# Patient Record
Sex: MALE | Race: OTHER
[De-identification: names, ages, dates, MRNs, and addresses within clinical notes are randomized per-mention and may not be internally consistent; named-entity substitution may affect disease eponyms.]

---

## 2020-10-07 ENCOUNTER — HOSPITAL ENCOUNTER (OUTPATIENT)
Dept: HOSPITAL 76 - LAB.WCP | Age: 85
Discharge: HOME | End: 2020-10-07
Attending: FAMILY MEDICINE
Payer: MEDICARE

## 2020-10-07 DIAGNOSIS — I10: ICD-10-CM

## 2020-10-07 DIAGNOSIS — E78.5: Primary | ICD-10-CM

## 2020-10-07 DIAGNOSIS — Z12.5: ICD-10-CM

## 2020-10-07 LAB
ALBUMIN DIAFP-MCNC: 4 G/DL (ref 3.2–5.5)
ALBUMIN/GLOB SERPL: 1.4 {RATIO} (ref 1–2.2)
ALP SERPL-CCNC: 73 IU/L (ref 42–121)
ALT SERPL W P-5'-P-CCNC: 22 IU/L (ref 10–60)
ANION GAP SERPL CALCULATED.4IONS-SCNC: 7 MMOL/L (ref 6–13)
AST SERPL W P-5'-P-CCNC: 18 IU/L (ref 10–42)
BASOPHILS NFR BLD AUTO: 0 10^3/UL (ref 0–0.1)
BASOPHILS NFR BLD AUTO: 0.9 %
BILIRUB BLD-MCNC: 0.6 MG/DL (ref 0.2–1)
BUN SERPL-MCNC: 14 MG/DL (ref 6–20)
CALCIUM UR-MCNC: 9 MG/DL (ref 8.5–10.3)
CHLORIDE SERPL-SCNC: 102 MMOL/L (ref 101–111)
CHOLEST SERPL-MCNC: 188 MG/DL
CO2 SERPL-SCNC: 27 MMOL/L (ref 21–32)
CREAT SERPLBLD-SCNC: 0.6 MG/DL (ref 0.6–1.2)
EOSINOPHIL # BLD AUTO: 0.3 10^3/UL (ref 0–0.7)
EOSINOPHIL NFR BLD AUTO: 6.1 %
ERYTHROCYTE [DISTWIDTH] IN BLOOD BY AUTOMATED COUNT: 12.5 % (ref 12–15)
GLOBULIN SER-MCNC: 2.9 G/DL (ref 2.1–4.2)
GLUCOSE SERPL-MCNC: 95 MG/DL (ref 70–100)
HDLC SERPL-MCNC: 54 MG/DL
HDLC SERPL: 3.5 {RATIO} (ref ?–5)
HGB UR QL STRIP: 12.9 G/DL (ref 14–18)
LDLC SERPL CALC-MCNC: 112 MG/DL
LDLC/HDLC SERPL: 2.1 {RATIO} (ref ?–3.6)
LYMPHOCYTES # SPEC AUTO: 1.3 10^3/UL (ref 1.5–3.5)
LYMPHOCYTES NFR BLD AUTO: 29.3 %
MCH RBC QN AUTO: 32.7 PG (ref 27–31)
MCHC RBC AUTO-ENTMCNC: 32.8 G/DL (ref 32–36)
MCV RBC AUTO: 99.7 FL (ref 80–94)
MONOCYTES # BLD AUTO: 0.4 10^3/UL (ref 0–1)
MONOCYTES NFR BLD AUTO: 9.6 %
NEUTROPHILS # BLD AUTO: 2.5 10^3/UL (ref 1.5–6.6)
NEUTROPHILS # SNV AUTO: 4.6 X10^3/UL (ref 4.8–10.8)
NEUTROPHILS NFR BLD AUTO: 53.9 %
PDW BLD AUTO: 10.2 FL (ref 7.4–11.4)
PLATELET # BLD: 235 10^3/UL (ref 130–450)
PROT SPEC-MCNC: 6.9 G/DL (ref 6.7–8.2)
RBC MAR: 3.94 10^6/UL (ref 4.7–6.1)
SODIUM SERPLBLD-SCNC: 136 MMOL/L (ref 135–145)
VLDLC SERPL-SCNC: 22 MG/DL

## 2020-10-07 PROCEDURE — 83721 ASSAY OF BLOOD LIPOPROTEIN: CPT

## 2020-10-07 PROCEDURE — 36415 COLL VENOUS BLD VENIPUNCTURE: CPT

## 2020-10-07 PROCEDURE — 85025 COMPLETE CBC W/AUTO DIFF WBC: CPT

## 2020-10-07 PROCEDURE — 84443 ASSAY THYROID STIM HORMONE: CPT

## 2020-10-07 PROCEDURE — 80053 COMPREHEN METABOLIC PANEL: CPT

## 2020-10-07 PROCEDURE — 80061 LIPID PANEL: CPT

## 2020-10-07 PROCEDURE — 84153 ASSAY OF PSA TOTAL: CPT

## 2021-07-12 ENCOUNTER — HOSPITAL ENCOUNTER (OUTPATIENT)
Dept: HOSPITAL 76 - LAB.WCP | Age: 86
Discharge: HOME | End: 2021-07-12
Attending: FAMILY MEDICINE
Payer: MEDICARE

## 2021-07-12 DIAGNOSIS — E78.5: ICD-10-CM

## 2021-07-12 DIAGNOSIS — I10: Primary | ICD-10-CM

## 2021-07-12 LAB
ALBUMIN DIAFP-MCNC: 4.2 G/DL (ref 3.2–5.5)
ALBUMIN/GLOB SERPL: 1.3 {RATIO} (ref 1–2.2)
ALP SERPL-CCNC: 81 IU/L (ref 42–121)
ALT SERPL W P-5'-P-CCNC: 24 IU/L (ref 10–60)
ANION GAP SERPL CALCULATED.4IONS-SCNC: 8 MMOL/L (ref 6–13)
AST SERPL W P-5'-P-CCNC: 20 IU/L (ref 10–42)
BASOPHILS NFR BLD AUTO: 0.1 10^3/UL (ref 0–0.1)
BASOPHILS NFR BLD AUTO: 0.9 %
BILIRUB BLD-MCNC: 0.8 MG/DL (ref 0.2–1)
BUN SERPL-MCNC: 21 MG/DL (ref 6–20)
CALCIUM UR-MCNC: 9 MG/DL (ref 8.5–10.3)
CHLORIDE SERPL-SCNC: 95 MMOL/L (ref 101–111)
CHOLEST SERPL-MCNC: 190 MG/DL
CO2 SERPL-SCNC: 27 MMOL/L (ref 21–32)
CREAT SERPLBLD-SCNC: 0.7 MG/DL (ref 0.6–1.2)
EOSINOPHIL # BLD AUTO: 0.3 10^3/UL (ref 0–0.7)
EOSINOPHIL NFR BLD AUTO: 5.3 %
ERYTHROCYTE [DISTWIDTH] IN BLOOD BY AUTOMATED COUNT: 12.1 % (ref 12–15)
GFRSERPLBLD MDRD-ARVRAT: 107 ML/MIN/{1.73_M2} (ref 89–?)
GLOBULIN SER-MCNC: 3.2 G/DL (ref 2.1–4.2)
GLUCOSE SERPL-MCNC: 132 MG/DL (ref 70–100)
HCT VFR BLD AUTO: 38.4 % (ref 42–52)
HDLC SERPL-MCNC: 54 MG/DL
HDLC SERPL: 3.5 {RATIO} (ref ?–5)
HGB UR QL STRIP: 13.1 G/DL (ref 14–18)
LDLC SERPL CALC-MCNC: 99 MG/DL
LDLC/HDLC SERPL: 1.8 {RATIO} (ref ?–3.6)
LYMPHOCYTES # SPEC AUTO: 1.6 10^3/UL (ref 1.5–3.5)
LYMPHOCYTES NFR BLD AUTO: 29.2 %
MCH RBC QN AUTO: 33.1 PG (ref 27–31)
MCHC RBC AUTO-ENTMCNC: 34.1 G/DL (ref 32–36)
MCV RBC AUTO: 97 FL (ref 80–94)
MONOCYTES # BLD AUTO: 0.6 10^3/UL (ref 0–1)
MONOCYTES NFR BLD AUTO: 10.3 %
NEUTROPHILS # BLD AUTO: 3 10^3/UL (ref 1.5–6.6)
NEUTROPHILS # SNV AUTO: 5.5 X10^3/UL (ref 4.8–10.8)
NEUTROPHILS NFR BLD AUTO: 54.1 %
NRBC # BLD AUTO: 0 /100WBC
NRBC # BLD AUTO: 0 X10^3/UL
PDW BLD AUTO: 10.3 FL (ref 7.4–11.4)
PLATELET # BLD: 225 10^3/UL (ref 130–450)
POTASSIUM SERPL-SCNC: 3.9 MMOL/L (ref 3.5–5)
PROT SPEC-MCNC: 7.4 G/DL (ref 6.7–8.2)
RBC MAR: 3.96 10^6/UL (ref 4.7–6.1)
SODIUM SERPLBLD-SCNC: 130 MMOL/L (ref 135–145)
TRIGL P FAST SERPL-MCNC: 184 MG/DL
VLDLC SERPL-SCNC: 37 MG/DL

## 2021-07-12 PROCEDURE — 80053 COMPREHEN METABOLIC PANEL: CPT

## 2021-07-12 PROCEDURE — 83721 ASSAY OF BLOOD LIPOPROTEIN: CPT

## 2021-07-12 PROCEDURE — 85025 COMPLETE CBC W/AUTO DIFF WBC: CPT

## 2021-07-12 PROCEDURE — 80061 LIPID PANEL: CPT

## 2021-07-12 PROCEDURE — 36415 COLL VENOUS BLD VENIPUNCTURE: CPT

## 2022-02-17 ENCOUNTER — HOSPITAL ENCOUNTER (OUTPATIENT)
Dept: HOSPITAL 76 - LAB.N | Age: 87
Discharge: HOME | End: 2022-02-17
Attending: FAMILY MEDICINE
Payer: MEDICARE

## 2022-02-17 DIAGNOSIS — I10: Primary | ICD-10-CM

## 2022-02-17 DIAGNOSIS — E78.5: ICD-10-CM

## 2022-02-17 DIAGNOSIS — E88.81: ICD-10-CM

## 2022-02-17 LAB
ALBUMIN DIAFP-MCNC: 4 G/DL (ref 3.2–5.5)
ALBUMIN/GLOB SERPL: 1.3 {RATIO} (ref 1–2.2)
ALP SERPL-CCNC: 86 IU/L (ref 42–121)
ALT SERPL W P-5'-P-CCNC: 23 IU/L (ref 10–60)
ANION GAP SERPL CALCULATED.4IONS-SCNC: 11 MMOL/L (ref 6–13)
AST SERPL W P-5'-P-CCNC: 18 IU/L (ref 10–42)
BASOPHILS NFR BLD AUTO: 0 10^3/UL (ref 0–0.1)
BASOPHILS NFR BLD AUTO: 0.7 %
BILIRUB BLD-MCNC: 0.8 MG/DL (ref 0.2–1)
BUN SERPL-MCNC: 20 MG/DL (ref 6–20)
CALCIUM UR-MCNC: 9.4 MG/DL (ref 8.5–10.3)
CHLORIDE SERPL-SCNC: 97 MMOL/L (ref 101–111)
CHOLEST SERPL-MCNC: 174 MG/DL
CO2 SERPL-SCNC: 27 MMOL/L (ref 21–32)
CREAT SERPLBLD-SCNC: 0.7 MG/DL (ref 0.6–1.2)
EOSINOPHIL # BLD AUTO: 0.2 10^3/UL (ref 0–0.7)
EOSINOPHIL NFR BLD AUTO: 5.8 %
ERYTHROCYTE [DISTWIDTH] IN BLOOD BY AUTOMATED COUNT: 12.1 % (ref 12–15)
EST. AVERAGE GLUCOSE BLD GHB EST-MCNC: 126 MG/DL (ref 70–100)
GFRSERPLBLD MDRD-ARVRAT: 107 ML/MIN/{1.73_M2} (ref 89–?)
GLOBULIN SER-MCNC: 3.2 G/DL (ref 2.1–4.2)
GLUCOSE SERPL-MCNC: 108 MG/DL (ref 70–100)
HBA1C MFR BLD HPLC: 6 % (ref 4.27–6.07)
HCT VFR BLD AUTO: 38.5 % (ref 42–52)
HDLC SERPL-MCNC: 60 MG/DL
HDLC SERPL: 2.9 {RATIO} (ref ?–5)
HGB UR QL STRIP: 13.3 G/DL (ref 14–18)
LDLC SERPL CALC-MCNC: 94 MG/DL
LDLC/HDLC SERPL: 1.6 {RATIO} (ref ?–3.6)
LYMPHOCYTES # SPEC AUTO: 1.4 10^3/UL (ref 1.5–3.5)
LYMPHOCYTES NFR BLD AUTO: 32.6 %
MCH RBC QN AUTO: 33.7 PG (ref 27–31)
MCHC RBC AUTO-ENTMCNC: 34.5 G/DL (ref 32–36)
MCV RBC AUTO: 97.5 FL (ref 80–94)
MONOCYTES # BLD AUTO: 0.5 10^3/UL (ref 0–1)
MONOCYTES NFR BLD AUTO: 12.3 %
NEUTROPHILS # BLD AUTO: 2 10^3/UL (ref 1.5–6.6)
NEUTROPHILS # SNV AUTO: 4.1 X10^3/UL (ref 4.8–10.8)
NEUTROPHILS NFR BLD AUTO: 48.6 %
NRBC # BLD AUTO: 0 /100WBC
NRBC # BLD AUTO: 0 X10^3/UL
PDW BLD AUTO: 10 FL (ref 7.4–11.4)
PLATELET # BLD: 244 10^3/UL (ref 130–450)
POTASSIUM SERPL-SCNC: 4 MMOL/L (ref 3.5–5)
PROT SPEC-MCNC: 7.2 G/DL (ref 6.7–8.2)
RBC MAR: 3.95 10^6/UL (ref 4.7–6.1)
SODIUM SERPLBLD-SCNC: 135 MMOL/L (ref 135–145)
TRIGL P FAST SERPL-MCNC: 101 MG/DL
VLDLC SERPL-SCNC: 20 MG/DL

## 2022-02-17 PROCEDURE — 83721 ASSAY OF BLOOD LIPOPROTEIN: CPT

## 2022-02-17 PROCEDURE — 80053 COMPREHEN METABOLIC PANEL: CPT

## 2022-02-17 PROCEDURE — 83036 HEMOGLOBIN GLYCOSYLATED A1C: CPT

## 2022-02-17 PROCEDURE — 85025 COMPLETE CBC W/AUTO DIFF WBC: CPT

## 2022-02-17 PROCEDURE — 36415 COLL VENOUS BLD VENIPUNCTURE: CPT

## 2022-02-17 PROCEDURE — 80061 LIPID PANEL: CPT

## 2022-05-26 ENCOUNTER — HOSPITAL ENCOUNTER (OUTPATIENT)
Dept: HOSPITAL 76 - LAB.N | Age: 87
Discharge: HOME | End: 2022-05-26
Attending: FAMILY MEDICINE
Payer: MEDICARE

## 2022-05-26 DIAGNOSIS — R22.41: Primary | ICD-10-CM

## 2022-05-26 LAB
ALBUMIN DIAFP-MCNC: 3.9 G/DL (ref 3.2–5.5)
ALBUMIN/GLOB SERPL: 1.2 {RATIO} (ref 1–2.2)
ALP SERPL-CCNC: 83 IU/L (ref 42–121)
ALT SERPL W P-5'-P-CCNC: 19 IU/L (ref 10–60)
ANION GAP SERPL CALCULATED.4IONS-SCNC: 10 MMOL/L (ref 6–13)
AST SERPL W P-5'-P-CCNC: 18 IU/L (ref 10–42)
BASOPHILS NFR BLD AUTO: 0 10^3/UL (ref 0–0.1)
BASOPHILS NFR BLD AUTO: 0.3 %
BILIRUB BLD-MCNC: 0.7 MG/DL (ref 0.2–1)
BUN SERPL-MCNC: 21 MG/DL (ref 6–20)
CALCIUM UR-MCNC: 9.1 MG/DL (ref 8.5–10.3)
CHLORIDE SERPL-SCNC: 97 MMOL/L (ref 101–111)
CO2 SERPL-SCNC: 26 MMOL/L (ref 21–32)
CREAT SERPLBLD-SCNC: 0.8 MG/DL (ref 0.6–1.2)
EOSINOPHIL # BLD AUTO: 0.1 10^3/UL (ref 0–0.7)
EOSINOPHIL NFR BLD AUTO: 1.8 %
ERYTHROCYTE [DISTWIDTH] IN BLOOD BY AUTOMATED COUNT: 12.1 % (ref 12–15)
GFRSERPLBLD MDRD-ARVRAT: 91 ML/MIN/{1.73_M2} (ref 89–?)
GLOBULIN SER-MCNC: 3.3 G/DL (ref 2.1–4.2)
GLUCOSE SERPL-MCNC: 122 MG/DL (ref 70–100)
HCT VFR BLD AUTO: 35.6 % (ref 42–52)
HGB UR QL STRIP: 12.2 G/DL (ref 14–18)
LYMPHOCYTES # SPEC AUTO: 1.1 10^3/UL (ref 1.5–3.5)
LYMPHOCYTES NFR BLD AUTO: 15.9 %
MCH RBC QN AUTO: 33.2 PG (ref 27–31)
MCHC RBC AUTO-ENTMCNC: 34.3 G/DL (ref 32–36)
MCV RBC AUTO: 97 FL (ref 80–94)
MONOCYTES # BLD AUTO: 0.8 10^3/UL (ref 0–1)
MONOCYTES NFR BLD AUTO: 11.6 %
NEUTROPHILS # BLD AUTO: 4.9 10^3/UL (ref 1.5–6.6)
NEUTROPHILS # SNV AUTO: 7 X10^3/UL (ref 4.8–10.8)
NEUTROPHILS NFR BLD AUTO: 70.1 %
NRBC # BLD AUTO: 0 /100WBC
NRBC # BLD AUTO: 0 X10^3/UL
PDW BLD AUTO: 10.3 FL (ref 7.4–11.4)
PLATELET # BLD: 226 10^3/UL (ref 130–450)
POTASSIUM SERPL-SCNC: 4.1 MMOL/L (ref 3.5–5)
PROT SPEC-MCNC: 7.2 G/DL (ref 6.7–8.2)
RBC MAR: 3.67 10^6/UL (ref 4.7–6.1)
SODIUM SERPLBLD-SCNC: 133 MMOL/L (ref 135–145)
URATE SERPL-MCNC: 5.9 MG/DL (ref 2.6–7.2)

## 2022-05-26 PROCEDURE — 85025 COMPLETE CBC W/AUTO DIFF WBC: CPT

## 2022-05-26 PROCEDURE — 85651 RBC SED RATE NONAUTOMATED: CPT

## 2022-05-26 PROCEDURE — 80053 COMPREHEN METABOLIC PANEL: CPT

## 2022-05-26 PROCEDURE — 36415 COLL VENOUS BLD VENIPUNCTURE: CPT

## 2022-05-26 PROCEDURE — 84550 ASSAY OF BLOOD/URIC ACID: CPT

## 2022-06-20 ENCOUNTER — HOSPITAL ENCOUNTER (OUTPATIENT)
Dept: HOSPITAL 76 - DI | Age: 87
Discharge: HOME | End: 2022-06-20
Attending: PODIATRIST
Payer: MEDICARE

## 2022-06-20 DIAGNOSIS — M19.071: Primary | ICD-10-CM

## 2022-06-21 NOTE — XRAY REPORT
PROCEDURE: Toe(s) RT 

 

INDICATIONS:  PAINFUL RIGHT HALLUX

 

TECHNIQUE:  3 views of the right first toe(s) acquired.  

 

COMPARISON:  None

 

FINDINGS:  

 

Bones:  No fractures or dislocations.  No suspicious bony lesions.  Particular osteophyte formation a
t the first metatarsophalangeal joint.

 

Soft tissues:  No suspicious soft tissue densities.  

 

IMPRESSION:  

Osteoarthritis. No acute fracture. No osseous lesion. If symptoms and/or clinical suspicion for patho
logy continue, further assessment with repeat plain films, or advanced imaging (e.g., CT, MRI, or bon
e scan) is recommended for further assessment.

 

Reviewed by: Roma Wells MD on 6/21/2022 10:14 AM PDT

Approved by: Roma Wells MD on 6/21/2022 10:14 AM PDT

 

 

Station ID:  [id number]

## 2023-01-11 ENCOUNTER — HOSPITAL ENCOUNTER (OUTPATIENT)
Dept: HOSPITAL 76 - LAB.N | Age: 88
Discharge: HOME | End: 2023-01-11
Attending: NURSE PRACTITIONER
Payer: MEDICARE

## 2023-01-11 DIAGNOSIS — I10: Primary | ICD-10-CM

## 2023-01-11 DIAGNOSIS — E78.5: ICD-10-CM

## 2023-01-11 DIAGNOSIS — N40.1: ICD-10-CM

## 2023-01-11 DIAGNOSIS — R97.20: ICD-10-CM

## 2023-01-11 LAB
ALBUMIN DIAFP-MCNC: 4 G/DL (ref 3.2–5.5)
ALBUMIN/GLOB SERPL: 1.2 {RATIO} (ref 1–2.2)
ALP SERPL-CCNC: 84 IU/L (ref 42–121)
ALT SERPL W P-5'-P-CCNC: 26 IU/L (ref 10–60)
ANION GAP SERPL CALCULATED.4IONS-SCNC: 8 MMOL/L (ref 6–13)
AST SERPL W P-5'-P-CCNC: 23 IU/L (ref 10–42)
BASOPHILS NFR BLD AUTO: 0 10^3/UL (ref 0–0.1)
BASOPHILS NFR BLD AUTO: 0.9 %
BILIRUB BLD-MCNC: 0.9 MG/DL (ref 0.2–1)
BUN SERPL-MCNC: 19 MG/DL (ref 6–20)
CALCIUM UR-MCNC: 9.5 MG/DL (ref 8.5–10.3)
CHLORIDE SERPL-SCNC: 102 MMOL/L (ref 101–111)
CHOLEST SERPL-MCNC: 201 MG/DL
CO2 SERPL-SCNC: 28 MMOL/L (ref 21–32)
CREAT SERPLBLD-SCNC: 0.7 MG/DL (ref 0.6–1.2)
EOSINOPHIL # BLD AUTO: 0.2 10^3/UL (ref 0–0.7)
EOSINOPHIL NFR BLD AUTO: 4.4 %
ERYTHROCYTE [DISTWIDTH] IN BLOOD BY AUTOMATED COUNT: 12.6 % (ref 12–15)
GFRSERPLBLD MDRD-ARVRAT: 107 ML/MIN/{1.73_M2} (ref 89–?)
GLOBULIN SER-MCNC: 3.3 G/DL (ref 2.1–4.2)
GLUCOSE SERPL-MCNC: 109 MG/DL (ref 70–100)
HCT VFR BLD AUTO: 38.9 % (ref 42–52)
HDLC SERPL-MCNC: 55 MG/DL
HDLC SERPL: 3.7 {RATIO} (ref ?–5)
HGB UR QL STRIP: 12.8 G/DL (ref 14–18)
LDLC SERPL CALC-MCNC: 122 MG/DL
LDLC/HDLC SERPL: 2.2 {RATIO} (ref ?–3.6)
LYMPHOCYTES # SPEC AUTO: 1.4 10^3/UL (ref 1.5–3.5)
LYMPHOCYTES NFR BLD AUTO: 31.4 %
MCH RBC QN AUTO: 32.4 PG (ref 27–31)
MCHC RBC AUTO-ENTMCNC: 32.9 G/DL (ref 32–36)
MCV RBC AUTO: 98.5 FL (ref 80–94)
MONOCYTES # BLD AUTO: 0.5 10^3/UL (ref 0–1)
MONOCYTES NFR BLD AUTO: 10.5 %
NEUTROPHILS # BLD AUTO: 2.4 10^3/UL (ref 1.5–6.6)
NEUTROPHILS # SNV AUTO: 4.6 X10^3/UL (ref 4.8–10.8)
NEUTROPHILS NFR BLD AUTO: 52.6 %
NRBC # BLD AUTO: 0 /100WBC
NRBC # BLD AUTO: 0 X10^3/UL
PDW BLD AUTO: 10.2 FL (ref 7.4–11.4)
PLATELET # BLD: 267 10^3/UL (ref 130–450)
POTASSIUM SERPL-SCNC: 3.9 MMOL/L (ref 3.5–5)
PROT SPEC-MCNC: 7.3 G/DL (ref 6.7–8.2)
RBC MAR: 3.95 10^6/UL (ref 4.7–6.1)
SODIUM SERPLBLD-SCNC: 138 MMOL/L (ref 135–145)
TRIGL P FAST SERPL-MCNC: 120 MG/DL
VLDLC SERPL-SCNC: 24 MG/DL

## 2023-01-11 PROCEDURE — 80061 LIPID PANEL: CPT

## 2023-01-11 PROCEDURE — 80053 COMPREHEN METABOLIC PANEL: CPT

## 2023-01-11 PROCEDURE — 83721 ASSAY OF BLOOD LIPOPROTEIN: CPT

## 2023-01-11 PROCEDURE — 85025 COMPLETE CBC W/AUTO DIFF WBC: CPT

## 2023-01-11 PROCEDURE — 36415 COLL VENOUS BLD VENIPUNCTURE: CPT

## 2023-01-11 PROCEDURE — 84153 ASSAY OF PSA TOTAL: CPT

## 2023-07-14 ENCOUNTER — HOSPITAL ENCOUNTER (OUTPATIENT)
Dept: HOSPITAL 76 - LAB.R | Age: 88
Discharge: HOME | End: 2023-07-14
Attending: PODIATRIST
Payer: MEDICARE

## 2023-07-14 DIAGNOSIS — L89.899: Primary | ICD-10-CM

## 2023-07-14 PROCEDURE — 87070 CULTURE OTHR SPECIMN AEROBIC: CPT

## 2023-07-14 PROCEDURE — 87205 SMEAR GRAM STAIN: CPT

## 2023-07-14 PROCEDURE — 87077 CULTURE AEROBIC IDENTIFY: CPT

## 2023-07-14 PROCEDURE — 87181 SC STD AGAR DILUTION PER AGT: CPT

## 2023-07-31 ENCOUNTER — HOSPITAL ENCOUNTER (OUTPATIENT)
Dept: HOSPITAL 76 - LAB.R | Age: 88
Discharge: HOME | End: 2023-07-31
Attending: PODIATRIST
Payer: MEDICARE

## 2023-07-31 DIAGNOSIS — L89.90: Primary | ICD-10-CM

## 2023-07-31 PROCEDURE — 87070 CULTURE OTHR SPECIMN AEROBIC: CPT

## 2023-07-31 PROCEDURE — 87181 SC STD AGAR DILUTION PER AGT: CPT

## 2023-07-31 PROCEDURE — 87205 SMEAR GRAM STAIN: CPT

## 2023-07-31 PROCEDURE — 87077 CULTURE AEROBIC IDENTIFY: CPT

## 2023-08-18 ENCOUNTER — HOSPITAL ENCOUNTER (OUTPATIENT)
Dept: HOSPITAL 76 - LAB | Age: 88
Discharge: HOME | End: 2023-08-18
Attending: PODIATRIST
Payer: MEDICARE

## 2023-08-18 DIAGNOSIS — L89.90: Primary | ICD-10-CM

## 2023-08-18 PROCEDURE — 87205 SMEAR GRAM STAIN: CPT

## 2023-08-18 PROCEDURE — 87070 CULTURE OTHR SPECIMN AEROBIC: CPT

## 2023-08-18 PROCEDURE — 87181 SC STD AGAR DILUTION PER AGT: CPT

## 2023-08-24 ENCOUNTER — HOSPITAL ENCOUNTER (OUTPATIENT)
Dept: HOSPITAL 76 - DI | Age: 88
Discharge: HOME | End: 2023-08-24
Attending: PODIATRIST
Payer: MEDICARE

## 2023-08-24 DIAGNOSIS — M79.9: Primary | ICD-10-CM

## 2023-08-24 NOTE — XRAY REPORT
PROCEDURE:  Foot 3 View RT

 

INDICATIONS:  RIGHT HALLUX WOUND

 

TECHNIQUE:  3 views of the foot were acquired.  

 

COMPARISON:  None.

 

FINDINGS:  

 

Bones:  No fractures or dislocations.  No suspicious bony lesions. Low bone mineralization. Plantar c
alcaneal enthesophyte. Pes planus. 

 

Soft tissues:  No suspicious soft tissue calcifications or masses. Soft tissue defect along the media
l great toe. 

 

 

IMPRESSION:  

Soft tissue defect along the medial great toe. No underlying bony abnormality to suggest acute ostium
 myelitis. However, the sensitivity of x-rays for osteomyelitis is variable. If there remains a high 
clinical concern, consider MRI.

 

 

 

Reviewed by: Sincere Hendrix on 8/24/2023 2:17 PM PDT

Approved by: Sincere Hendrix on 8/24/2023 2:17 PM PDT

 

 

Station ID:  [id number]

## 2023-09-04 ENCOUNTER — HOSPITAL ENCOUNTER (OUTPATIENT)
Dept: HOSPITAL 76 - LAB.R | Age: 88
Discharge: HOME | End: 2023-09-04
Attending: PODIATRIST
Payer: MEDICARE

## 2023-09-04 DIAGNOSIS — L89.90: Primary | ICD-10-CM

## 2023-09-04 PROCEDURE — 87070 CULTURE OTHR SPECIMN AEROBIC: CPT

## 2023-09-04 PROCEDURE — 87205 SMEAR GRAM STAIN: CPT

## 2024-09-12 ENCOUNTER — HOSPITAL ENCOUNTER (OUTPATIENT)
Dept: HOSPITAL 76 - LAB.N | Age: 89
Discharge: HOME | End: 2024-09-12
Attending: NURSE PRACTITIONER
Payer: MEDICARE

## 2024-09-12 DIAGNOSIS — E78.5: ICD-10-CM

## 2024-09-12 DIAGNOSIS — R73.03: ICD-10-CM

## 2024-09-12 DIAGNOSIS — I10: Primary | ICD-10-CM

## 2024-09-12 LAB
ALBUMIN DIAFP-MCNC: 3.9 G/DL (ref 3.2–5.5)
ALBUMIN/GLOB SERPL: 1.1 {RATIO} (ref 1–2.2)
ALP SERPL-CCNC: 91 IU/L (ref 42–121)
ALT SERPL W P-5'-P-CCNC: 16 IU/L (ref 10–60)
ANION GAP SERPL CALCULATED.4IONS-SCNC: 9 MMOL/L (ref 6–13)
AST SERPL W P-5'-P-CCNC: 16 IU/L (ref 10–42)
BASOPHILS NFR BLD AUTO: 0 10^3/UL (ref 0–0.1)
BASOPHILS NFR BLD AUTO: 0.4 %
BILIRUB BLD-MCNC: 0.4 MG/DL (ref 0.2–1)
BUN SERPL-MCNC: 23 MG/DL (ref 6–20)
CALCIUM UR-MCNC: 10.1 MG/DL (ref 8.5–10.3)
CHLORIDE SERPL-SCNC: 99 MMOL/L (ref 101–111)
CHOLEST SERPL-MCNC: 107 MG/DL
CO2 SERPL-SCNC: 26 MMOL/L (ref 21–32)
CREAT SERPLBLD-SCNC: 0.8 MG/DL (ref 0.6–1.3)
EOSINOPHIL # BLD AUTO: 0.2 10^3/UL (ref 0–0.7)
EOSINOPHIL NFR BLD AUTO: 3.4 %
ERYTHROCYTE [DISTWIDTH] IN BLOOD BY AUTOMATED COUNT: 12.6 % (ref 12–15)
EST. AVERAGE GLUCOSE BLD GHB EST-MCNC: 131 MG/DL (ref 70–100)
GFRSERPLBLD MDRD-ARVRAT: 91 ML/MIN/{1.73_M2} (ref 89–?)
GLOBULIN SER-MCNC: 3.5 G/DL (ref 2.1–4.2)
GLUCOSE SERPL-MCNC: 113 MG/DL (ref 74–104)
HBA1C MFR BLD HPLC: 6.2 % (ref 4.27–6.07)
HCT VFR BLD AUTO: 32.6 % (ref 42–52)
HDLC SERPL-MCNC: 57 MG/DL
HDLC SERPL: 1.9 {RATIO} (ref ?–5)
HGB UR QL STRIP: 10.9 G/DL (ref 14–18)
LDLC SERPL CALC-MCNC: 39 MG/DL
LDLC/HDLC SERPL: 0.7 {RATIO} (ref ?–3.6)
LYMPHOCYTES # SPEC AUTO: 1.2 10^3/UL (ref 1.5–3.5)
LYMPHOCYTES NFR BLD AUTO: 20.8 %
MCH RBC QN AUTO: 32.6 PG (ref 27–31)
MCHC RBC AUTO-ENTMCNC: 33.4 G/DL (ref 32–36)
MCV RBC AUTO: 97.6 FL (ref 80–94)
MONOCYTES # BLD AUTO: 0.7 10^3/UL (ref 0–1)
MONOCYTES NFR BLD AUTO: 12.4 %
NEUTROPHILS # BLD AUTO: 3.5 10^3/UL (ref 1.5–6.6)
NEUTROPHILS # SNV AUTO: 5.6 X10^3/UL (ref 4.8–10.8)
NEUTROPHILS NFR BLD AUTO: 62.8 %
NRBC # BLD AUTO: 0 /100WBC
NRBC # BLD AUTO: 0 X10^3/UL
PDW BLD AUTO: 10.1 FL (ref 7.4–11.4)
PLATELET # BLD: 275 10^3/UL (ref 130–450)
POTASSIUM SERPL-SCNC: 3.9 MMOL/L (ref 3.5–4.5)
PROT SPEC-MCNC: 7.4 G/DL (ref 6.4–8.9)
RBC MAR: 3.34 10^6/UL (ref 4.7–6.1)
SODIUM SERPLBLD-SCNC: 134 MMOL/L (ref 135–145)
TRIGL P FAST SERPL-MCNC: 57 MG/DL
TSH SERPL-ACNC: 2.64 UIU/ML (ref 0.34–5.6)
VLDLC SERPL-SCNC: 11 MG/DL

## 2024-09-12 PROCEDURE — 36415 COLL VENOUS BLD VENIPUNCTURE: CPT

## 2024-09-12 PROCEDURE — 80053 COMPREHEN METABOLIC PANEL: CPT

## 2024-09-12 PROCEDURE — 85025 COMPLETE CBC W/AUTO DIFF WBC: CPT

## 2024-09-12 PROCEDURE — 83721 ASSAY OF BLOOD LIPOPROTEIN: CPT

## 2024-09-12 PROCEDURE — 84443 ASSAY THYROID STIM HORMONE: CPT

## 2024-09-12 PROCEDURE — 80061 LIPID PANEL: CPT

## 2024-09-12 PROCEDURE — 83036 HEMOGLOBIN GLYCOSYLATED A1C: CPT
